# Patient Record
Sex: FEMALE | URBAN - METROPOLITAN AREA
[De-identification: names, ages, dates, MRNs, and addresses within clinical notes are randomized per-mention and may not be internally consistent; named-entity substitution may affect disease eponyms.]

---

## 2018-03-05 ENCOUNTER — OFFICE VISIT (OUTPATIENT)
Dept: MIDWIFE SERVICES | Facility: CLINIC | Age: 28
End: 2018-03-05
Payer: COMMERCIAL

## 2018-03-05 ENCOUNTER — TRANSFERRED RECORDS (OUTPATIENT)
Dept: HEALTH INFORMATION MANAGEMENT | Facility: CLINIC | Age: 28
End: 2018-03-05

## 2018-03-05 VITALS
SYSTOLIC BLOOD PRESSURE: 118 MMHG | DIASTOLIC BLOOD PRESSURE: 78 MMHG | WEIGHT: 118 LBS | HEART RATE: 80 BPM | HEIGHT: 57 IN | BODY MASS INDEX: 25.46 KG/M2

## 2018-03-05 DIAGNOSIS — Z30.09 ENCOUNTER FOR OTHER GENERAL COUNSELING AND ADVICE ON CONTRACEPTION: ICD-10-CM

## 2018-03-05 DIAGNOSIS — Z01.812 PRE-PROCEDURE LAB EXAM: Primary | ICD-10-CM

## 2018-03-05 DIAGNOSIS — Z30.430 ENCOUNTER FOR INSERTION OF PARAGARD IUD: ICD-10-CM

## 2018-03-05 PROBLEM — O34.42: Status: ACTIVE | Noted: 2018-03-05

## 2018-03-05 LAB — BETA HCG QUAL IFA URINE: NEGATIVE

## 2018-03-05 PROCEDURE — 84703 CHORIONIC GONADOTROPIN ASSAY: CPT | Performed by: NURSE PRACTITIONER

## 2018-03-05 PROCEDURE — 58300 INSERT INTRAUTERINE DEVICE: CPT | Performed by: NURSE PRACTITIONER

## 2018-03-05 PROCEDURE — 99203 OFFICE O/P NEW LOW 30 MIN: CPT | Mod: 25 | Performed by: NURSE PRACTITIONER

## 2018-03-05 RX ORDER — COPPER 313.4 MG/1
1 INTRAUTERINE DEVICE INTRAUTERINE ONCE
Qty: 1 EACH
Start: 2018-03-05 | End: 2018-03-05

## 2018-03-05 RX ORDER — LEVOTHYROXINE SODIUM 200 UG/1
200 TABLET ORAL DAILY
Refills: 3 | COMMUNITY
Start: 2018-02-21

## 2018-03-05 ASSESSMENT — ANXIETY QUESTIONNAIRES
GAD7 TOTAL SCORE: 6
3. WORRYING TOO MUCH ABOUT DIFFERENT THINGS: SEVERAL DAYS
7. FEELING AFRAID AS IF SOMETHING AWFUL MIGHT HAPPEN: SEVERAL DAYS
2. NOT BEING ABLE TO STOP OR CONTROL WORRYING: SEVERAL DAYS
IF YOU CHECKED OFF ANY PROBLEMS ON THIS QUESTIONNAIRE, HOW DIFFICULT HAVE THESE PROBLEMS MADE IT FOR YOU TO DO YOUR WORK, TAKE CARE OF THINGS AT HOME, OR GET ALONG WITH OTHER PEOPLE: NOT DIFFICULT AT ALL
6. BECOMING EASILY ANNOYED OR IRRITABLE: SEVERAL DAYS
5. BEING SO RESTLESS THAT IT IS HARD TO SIT STILL: NOT AT ALL
1. FEELING NERVOUS, ANXIOUS, OR ON EDGE: SEVERAL DAYS

## 2018-03-05 ASSESSMENT — PATIENT HEALTH QUESTIONNAIRE - PHQ9: 5. POOR APPETITE OR OVEREATING: SEVERAL DAYS

## 2018-03-05 NOTE — PROGRESS NOTES
Results discussed directly with patient while patient was present. Any further details documented in the note.   NOEL Glover CNM

## 2018-03-05 NOTE — MR AVS SNAPSHOT
After Visit Summary   3/5/2018    Lisha Chacko    MRN: [id number]           Patient Information     Date Of Birth          1990        Visit Information        Provider Department      3/5/2018 10:30 AM Elaine Gordillo APRN CNM [facility]        Today's Diagnoses     Pre-procedure lab exam    -  1    Encounter for insertion of ParaGard IUD        Encounter for other general counseling and advice on contraception          Care Instructions    Your Intrauterine Device (IUD)     What to watch for right after IUD placement:      Some women may experience uterine cramps, bleeding, and/or dizziness during and right after IUD placement.       To help minimize the cramps, you may take ibuprofen 600 mg with food. These symptoms should improve over the next 24 hours.  Mild cramping may be present for a few days after IUD placement. You may continue taking ibuprofen as directed if needed.      You may experience spotting or bleeding for the first few weeks to months after IUD placement.        Other side effects can include:  Anemia, hemorrhage, partial or complete expulsion of device, uterine or cervical perforation, embedding of IUD in the uterine wall, increased risk of pelvic inflammatory disease.  Women who become pregnant with an IUD in place are at higher risk of an ectopic pregnancy.  There is also a higher risk of miscarriage when pregnancy occurs with an IUD in place.      Use condoms or abstain from sex for 7 days after the insertion of your Mirena or Kyleena or Charlotte  IUD.  This is not necessary if you had a ParaGard IUD placed.      If you experience fever, chills, abdominal pain, worsening pelvic pain, dizziness, unusually heavy vaginal bleeding, suspected expulsion of device or foul smelling vaginal discharge please call the clinic for evaluation.      Please schedule an appointment at the clinic for a string check 4-6 weeks after IUD placement    Your periods may  change (Charlotte/Kyleena/Mirena):      For the first 3 to 6 months, your monthly period may become irregular. You may also have frequent spotting or light bleeding. A few women have heavy bleeding during this time. After your body adjusts, the number of bleeding days is likely to decrease (but may remain irregular), and you may even find that your periods stop altogether for as long as Charlotte/Mirena is in place.  Your periods will return rapidly once the IUD is removed.      ParaGard IUD users:      ParaGard IUD users may experience heavier than normal cycles while their IUD is in place, this is considered normal     Back-up contraception is not needed      Checking for your strings:      We encourage everyone with an IUD in place to check for their strings monthly      You may check your own IUD strings by inserting a finger into the vagina and feeling the strings as they exit the cervix.  The strings will initially feel firm, like fishing line, but will soften over a few weeks.  After the strings have softened, you or your partner should not be able to feel the strings during intercourse.       If the string length greatly changes or if you cannot feel your strings at all please make an appointment to see you midwife and use a backup method of contraception like a condom.      If you can feel something hard/plastic like the IUD may not be in the correct place. You should then see your healthcare provider to have the position confirmed with ultrasound.       Remember:    IUD's do not protect against HIV or STIs.  IUD's do not prevent the formation of ovarian cysts.  IUD's do not typically reduce acne or cause weight gain or mood changes.    For more information:  Http://www.mirena-us.com/    The ParaGard IUD is effective for 10 years.  The Charlotte IUD is effective for 3 years.  The Kyleena/Mirena IUD is effective for 5 years.  Your IUD can easily be removed by a healthcare professional at any time.    Do not try to  "remove the IUD yourself.      If you have questions or concerns please call:    [facility]   [phone]            Follow-ups after your visit        Follow-up notes from your care team     Return in about 4 weeks (around 2018), or if symptoms worsen or fail to improve, for Birth control follow up.      Who to contact     If you have questions or need follow up information about today's clinic visit or your schedule please contact [facility] directly at [phone].  Normal or non-critical lab and imaging results will be communicated to you by [employer], letter or phone within 4 business days after the clinic has received the results. If you do not hear from us within 7 days, please contact the clinic through Acceptdt or phone. If you have a critical or abnormal lab result, we will notify you by phone as soon as possible.  Submit refill requests through ScreenHits or call your pharmacy and they will forward the refill request to us. Please allow 3 business days for your refill to be completed.          Additional Information About Your Visit        [employer]Browser[employer] Information     ScreenHits lets you send messages to your doctor, view your test results, renew your prescriptions, schedule appointments and more. To sign up, go to [email or web address] . Click on \"Log in\" on the left side of the screen, which will take you to the Welcome page. Then click on \"Sign up Now\" on the right side of the page.     You will be asked to enter the access code listed below, as well as some personal information. Please follow the directions to create your username and password.     Your access code is: [email or web address]  Expires: 6/3/2018  9:02 AM     Your access code will  in 90 days. If you need help or a new code, please call your [place] clinic or [phone].        Care EveryWhere ID     This is your Care EveryWhere ID. This could be used by other organizations to access your [place] medical " "records  [id number]        Your Vitals Were     Pulse Height Last Period BMI (Body Mass Index)          80 4' 9\" (1.448 m) 03/01/2018 25.53 kg/m2         Blood Pressure from Last 3 Encounters:   03/05/18 118/78    Weight from Last 3 Encounters:   03/05/18 118 lb (53.5 kg)              We Performed the Following     Beta HCG Qual, Urine - [facility] [place] ([id number])     INSERTION INTRAUTERINE DEVICE     INTRAUT COPPER CONTRACEPTIVE          Today's Medication Changes          These changes are accurate as of 3/5/18 10:51 AM.  If you have any questions, ask your nurse or doctor.               Start taking these medicines.        Dose/Directions    paragard intrauterine copper   Used for:  Encounter for insertion of ParaGard IUD   Started by:  Elaine Gordillo APRN CNM        Dose:  1 each   1 each by Intrauterine route once for 1 dose IUD type: Paragard T-380 Lot #: [id number] EXP 4/2024 NDC#: 80414-214-10   Quantity:  1 each   Refills:  0            Where to get your medicines      Some of these will need a paper prescription and others can be bought over the counter.  Ask your nurse if you have questions.     You don't need a prescription for these medications     paragard intrauterine copper                Primary Care Provider Office Phone # Fax #    [facility] [place] [facility] [phone]       [address] [facility]  [facility] [address]        Equal Access to Services     SEEMA [place] AH: Hadii edie lynch hadbeckio Sodomonique, waaxda luqadaha, qaybta kaalmada adeegyada, wax kathia davis. So [facility] [phone].    ATENCIÓN: Si habla español, tiene a alas disposición servicios gratuitos de asistencia lingüística. Llame al [phone].    We comply with applicable federal civil rights laws and [place] laws. We do not discriminate on the basis of race, color, national origin, age, disability, sex, sexual orientation, or gender " identity.            Thank you!     Thank you for choosing [facility] [place]  for your care. Our goal is always to provide you with excellent care. Hearing back from our patients is one way we can continue to improve our services. Please take a few minutes to complete the written survey that you may receive in the mail after your visit with us. Thank you!             Your Updated Medication List - Protect others around you: Learn how to safely use, store and throw away your medicines at www.disposemymeds.org.          This list is accurate as of 3/5/18 10:51 AM.  Always use your most recent med list.                   Brand Name Dispense Instructions for use Diagnosis    levothyroxine 200 MCG tablet    SYNTHROID/LEVOTHROID     Take 200 mcg by mouth daily        paragard intrauterine copper     1 each    1 each by Intrauterine route once for 1 dose IUD type: Paragard T-380 Lot #: [id number] EXP 4/2024 NDC#: 29481-264-88    Encounter for insertion of ParaGard IUD

## 2018-03-05 NOTE — PROGRESS NOTES
SUBJECTIVE:   Lisha Chacko is a 27 year old who presents to the clinic for discussion of birth control methods.   She has used the following methods in the past: SHANA and condoms  Today she is interested in discussing Paragard IUD  Histories reviewed and updated  Past Medical History:   Diagnosis Date     Anxiety      Depression      Hashimoto's thyroiditis      History of HPV infection      Past Surgical History:   Procedure Laterality Date     LASIK BILATERAL  2011     LEEP [place], CERVICAL  05/2013     Social History     Social History     Marital status: Single     Spouse name: N/A     Number of children: N/A     Years of education: N/A     Occupational History     surgery coordinator      [employer]     Social History Main Topics     Smoking status: Never Smoker     Smokeless tobacco: Never Used     Alcohol use 0.6 - 1.2 oz/week     1 - 2 Cans of beer per week      Comment: 2-4 x month     Drug use: No     Sexual activity: Yes     Partners: Male     Other Topics Concern     Not on file     Social History Narrative     No narrative on file     Family History   Problem Relation Age of Onset     Hyperlipidemia Mother      Thyroid Cancer Mother      Hyperlipidemia Father      Hypertension Father      Hypertension Sister      DIABETES Maternal Grandfather      DIABETES Maternal Aunt        Menstrual History:  Menses every 28 days.  Length of menses: 6 days  Menstrual description: normal and crampy    Denies the following contraindications to estrogen/progesterone combined contraception:  Migraine with aura  Smoking over age 35  Liver disease  Personal history of blood clot or stroke   History of heart disease  History of breast cancer  Undiagnosed vaginal bleeding  Hypertension  Pregnancy    Denies the following contraindications to the IUD:  Distortion of the uterine cavity  Conrado's disease/copper allergy  Active pelvic infection  Unexplained uterine bleeding  Known or suspected pregnancy  Breast cancer or liver  "disease    Health maintenance updated:  yes    ROS:   12 point review of systems negative other than symptoms noted below.  Gastrointestinal: Bloating  Genitourinary: Vaginal Dryness    EXAM:  /78  Pulse 80  Ht 4' 9\" (1.448 m)  Wt 118 lb (53.5 kg)  LMP 03/01/2018  BMI 25.53 kg/m2  Body mass index is 25.53 kg/(m^2).    ASSESSMENT/PLAN:    ICD-10-CM    1. Pre-procedure lab exam Z01.812 Beta HCG Qual, Urine - [facility] [place] ([id number])   2. Encounter for insertion of ParaGard IUD Z30.430 paragard intrauterine copper     INSERTION INTRAUTERINE DEVICE     INTRAUT COPPER CONTRACEPTIVE   3. Encounter for other general counseling and advice on contraception Z30.09      There are no contraindications to the use of Mirena IUD    COUNSELING:  Reviewed risks and benefits of contraceptive use  Counseled on SE, alternatives.(insert ocp counseling)  Discussed proper use of chosen method Paragard  Handouts/Instrucions provided    MIDWIFE IUD PLACEMENT NOTE    IUD type: Paragard T-380  Lot #: [id number]  EXP 4/2024  NDC#: 18473-039-28    HPI:   Lisha Chacko is a 27 year old female here today for IUD insertion.  Patient's last menstrual period was 03/01/2018.  Today's pregnancy test - Negative  Patient has premedicated with Ibuprofen 600 mgs  Patient did use Cytotec prior to IUD insertion  STD testing offered? declined  Patient does not have any infections or cervicitis  Patient does not have history of liver problems or cancer.     Patient has been given written information.  I have reviewed the risks of the IUD including pregnancy, PID, life threatening infection, perforation, expulsion, cramping, changes in bleeding and ovarian cysts. Benefits of the IUD and alternative family planning methods have been discussed.  The probable mechanisms of action were covered, failure rates, spontaneous expulsion, the importance of checking the string monthly, as well as minor or nuisance side effects such as ovarian cysts, migraines, " "skin changes irregular and unpredictable bleeding in the first 6 months of use and bleeding patterns after the first 6 months.  The 's printed material was provided for her review.  Patients questions are answered.  Patient has verbalized understanding of risks and benefits and has signed the consent form.      Verification of Procedure:  Before the procedure began, I verified:  Patient Name: Lisha Chacko  Yes  Patient :  1990  Yes  Procedure to be performed:  Yes    Health maintenance updated:  yes    Allergies   Allergen Reactions     Latex      Penicillin G      No current outpatient prescriptions on file.      Past Medical History:   Diagnosis Date     Anxiety      Depression      Hashimoto's thyroiditis      History of HPV infection      Family History   Problem Relation Age of Onset     Hyperlipidemia Mother      Thyroid Cancer Mother      Hyperlipidemia Father      Hypertension Father      Hypertension Sister      DIABETES Maternal Grandfather      DIABETES Maternal Aunt      Social History     Social History     Marital status: Single     Spouse name: N/A     Number of children: N/A     Years of education: N/A     Occupational History     surgery coordinator      [employer]     Social History Main Topics     Smoking status: Never Smoker     Smokeless tobacco: Never Used     Alcohol use 0.6 - 1.2 oz/week     1 - 2 Cans of beer per week      Comment: 2-4 x month     Drug use: No     Sexual activity: Yes     Partners: Male     Other Topics Concern     Not on file     Social History Narrative     No narrative on file     Past Surgical History:   Procedure Laterality Date     LASIK BILATERAL       LEEP TX, CERVICAL  2013       REVIEW OF SYSTEMS:  12 point review of systems negative other than symptoms noted below.  Constitutional: Fatigue      EXAM:  /78  Pulse 80  Ht 4' 9\" (1.448 m)  Wt 118 lb (53.5 kg)  LMP 2018  BMI 25.53 kg/m2    PELVIC EXAM:  Vulva: No external " lesions, BUS WNL  Vagina: Moist, pink, discharge normal  well rugated, no lesions  Cervix: smooth, pink, no visible lesions, neg CMT  Uterus: Normal size, anteverted, non-tender, mobile  Ovaries: No mass, non-tender  Rectal exam: deferred      ASSESSMENT/ PLAN:    ICD-10-CM    1. Pre-procedure lab exam Z01.812 Beta HCG Qual, Urine - [facility] [place] ([id number])   2. Encounter for insertion of ParaGard IUD Z30.430 paragard intrauterine copper     INSERTION INTRAUTERINE DEVICE     INTRAUT COPPER CONTRACEPTIVE   3. Encounter for other general counseling and advice on contraception Z30.09      Procedure:  Uterus assessed for position and is anteverted.  Sterile speculum inserted.  Betadine prep of cervix done.  Tenaculum applied at 10 and 2 o'clock.  Uterine sounded to 7cm's.  Cervical dilators were used.   IUD inserted in the usual fashion without difficulty.  Tenaculum removed with scant bleeding from the cervix.  Strings trimmed to 3 cm's.  Patient tolerated the procedure well    Results for orders placed or performed in visit on 03/05/18   Beta HCG Qual, Urine - [facility] ([id number])   Result Value Ref Range    Beta HCG Qual IFA Urine Negative NEG^Negative          COUNSELING:  Verbal and written instructions given to patient regarding checking IUD strings.    Reviewed warning signs of fever, sharp/severe abdominal pain, reassured it can be normal to have menstrual like cramping after placement and spotting/light bleeding may last a few weeks after placement.    Reviewed with patient that the IUD needs to be replaced in 10 years, nothing in vagina for 2 days following placement of Mirena or Charlotte IUD's.  Use b/u method for one week following IUD placement.    Follow up appointment in 6 to 12 weeks for IUD/string check    Elaine COHEN CNM

## 2018-03-05 NOTE — PATIENT INSTRUCTIONS
Your Intrauterine Device (IUD)     What to watch for right after IUD placement:      Some women may experience uterine cramps, bleeding, and/or dizziness during and right after IUD placement.       To help minimize the cramps, you may take ibuprofen 600 mg with food. These symptoms should improve over the next 24 hours.  Mild cramping may be present for a few days after IUD placement. You may continue taking ibuprofen as directed if needed.      You may experience spotting or bleeding for the first few weeks to months after IUD placement.        Other side effects can include:  Anemia, hemorrhage, partial or complete expulsion of device, uterine or cervical perforation, embedding of IUD in the uterine wall, increased risk of pelvic inflammatory disease.  Women who become pregnant with an IUD in place are at higher risk of an ectopic pregnancy.  There is also a higher risk of miscarriage when pregnancy occurs with an IUD in place.      Use condoms or abstain from sex for 7 days after the insertion of your Mirena or Kyleena or Charlotte  IUD.  This is not necessary if you had a ParaGard IUD placed.      If you experience fever, chills, abdominal pain, worsening pelvic pain, dizziness, unusually heavy vaginal bleeding, suspected expulsion of device or foul smelling vaginal discharge please call the clinic for evaluation.      Please schedule an appointment at the clinic for a string check 4-6 weeks after IUD placement    Your periods may change (Charlotte/Kyleena/Mirena):      For the first 3 to 6 months, your monthly period may become irregular. You may also have frequent spotting or light bleeding. A few women have heavy bleeding during this time. After your body adjusts, the number of bleeding days is likely to decrease (but may remain irregular), and you may even find that your periods stop altogether for as long as Charlotte/Mirena is in place.  Your periods will return rapidly once the IUD is removed.      ParaGard IUD  users:      ParaGard IUD users may experience heavier than normal cycles while their IUD is in place, this is considered normal     Back-up contraception is not needed      Checking for your strings:      We encourage everyone with an IUD in place to check for their strings monthly      You may check your own IUD strings by inserting a finger into the vagina and feeling the strings as they exit the cervix.  The strings will initially feel firm, like fishing line, but will soften over a few weeks.  After the strings have softened, you or your partner should not be able to feel the strings during intercourse.       If the string length greatly changes or if you cannot feel your strings at all please make an appointment to see you midwife and use a backup method of contraception like a condom.      If you can feel something hard/plastic like the IUD may not be in the correct place. You should then see your healthcare provider to have the position confirmed with ultrasound.       Remember:    IUD's do not protect against HIV or STIs.  IUD's do not prevent the formation of ovarian cysts.  IUD's do not typically reduce acne or cause weight gain or mood changes.    For more information:  Http://www.mirena-us.com/    The ParaGard IUD is effective for 10 years.  The Charlotte IUD is effective for 3 years.  The Kyleena/Mirena IUD is effective for 5 years.  Your IUD can easily be removed by a healthcare professional at any time.    Do not try to remove the IUD yourself.      If you have questions or concerns please call:    [facility]   [phone]

## 2018-03-06 ASSESSMENT — PATIENT HEALTH QUESTIONNAIRE - PHQ9: SUM OF ALL RESPONSES TO PHQ QUESTIONS 1-9: 1

## 2018-03-06 ASSESSMENT — ANXIETY QUESTIONNAIRES: GAD7 TOTAL SCORE: 6

## 2018-03-12 ENCOUNTER — HEALTH MAINTENANCE LETTER (OUTPATIENT)
Age: 28
End: 2018-03-12

## 2018-10-22 NOTE — PROGRESS NOTES
Please abstract the following data from this visit with this patient into the appropriate field in Epic:    Pap smear done on this date: 2/8/2016 (approximately), by this group: [facility], results were WNL.

## 2019-04-19 ENCOUNTER — HEALTH MAINTENANCE LETTER (OUTPATIENT)
Age: 29
End: 2019-04-19

## 2020-03-11 ENCOUNTER — HEALTH MAINTENANCE LETTER (OUTPATIENT)
Age: 30
End: 2020-03-11

## 2021-01-03 ENCOUNTER — HEALTH MAINTENANCE LETTER (OUTPATIENT)
Age: 31
End: 2021-01-03

## 2021-04-25 ENCOUNTER — HEALTH MAINTENANCE LETTER (OUTPATIENT)
Age: 31
End: 2021-04-25

## 2021-10-10 ENCOUNTER — HEALTH MAINTENANCE LETTER (OUTPATIENT)
Age: 31
End: 2021-10-10

## 2022-05-21 ENCOUNTER — HEALTH MAINTENANCE LETTER (OUTPATIENT)
Age: 32
End: 2022-05-21

## 2022-09-18 ENCOUNTER — HEALTH MAINTENANCE LETTER (OUTPATIENT)
Age: 32
End: 2022-09-18

## 2023-06-04 ENCOUNTER — HEALTH MAINTENANCE LETTER (OUTPATIENT)
Age: 33
End: 2023-06-04